# Patient Record
Sex: MALE | Race: WHITE | NOT HISPANIC OR LATINO | Employment: OTHER | URBAN - METROPOLITAN AREA
[De-identification: names, ages, dates, MRNs, and addresses within clinical notes are randomized per-mention and may not be internally consistent; named-entity substitution may affect disease eponyms.]

---

## 2017-01-04 ENCOUNTER — TELEPHONE (OUTPATIENT)
Dept: PODIATRY | Facility: CLINIC | Age: 77
End: 2017-01-04

## 2017-01-04 NOTE — TELEPHONE ENCOUNTER
----- Message from Inocencia Ignacio sent at 1/3/2017  5:06 PM CST -----  Contact: [phone]/ self    Patient needs a prescription for pain killers. He stepped on a rock with foot that was operated on. Patient says that the pain medicine that he has does not work. Please advise.

## 2017-01-04 NOTE — TELEPHONE ENCOUNTER
----- Message from Geraldine Salcedo sent at 1/3/2017  1:39 PM CST -----  Contact: [phone] / self   Patient requesting to speak with you regarding his foot operation.    States it is still hurting.    Would really like to speak with Dr Green.    Please advise.

## 2017-01-04 NOTE — TELEPHONE ENCOUNTER
----- Message from Jeanne Whitmore sent at 1/3/2017  3:44 PM CST -----  Contact: [phone]  Patient is returning your call

## 2017-01-05 ENCOUNTER — OFFICE VISIT (OUTPATIENT)
Dept: PODIATRY | Facility: CLINIC | Age: 77
End: 2017-01-05
Payer: MEDICARE

## 2017-01-05 VITALS
WEIGHT: 239 LBS | HEART RATE: 97 BPM | SYSTOLIC BLOOD PRESSURE: 138 MMHG | DIASTOLIC BLOOD PRESSURE: 79 MMHG | BODY MASS INDEX: 29.72 KG/M2 | HEIGHT: 75 IN

## 2017-01-05 DIAGNOSIS — S91.331A PUNCTURE WOUND OF FOOT, RIGHT, INITIAL ENCOUNTER: Primary | ICD-10-CM

## 2017-01-05 DIAGNOSIS — M79.671 FOOT PAIN, RIGHT: ICD-10-CM

## 2017-01-05 DIAGNOSIS — S91.301A WOUND, OPEN, FOOT, RIGHT, INITIAL ENCOUNTER: ICD-10-CM

## 2017-01-05 PROCEDURE — 3075F SYST BP GE 130 - 139MM HG: CPT | Mod: S$GLB,,, | Performed by: PODIATRIST

## 2017-01-05 PROCEDURE — 99999 PR PBB SHADOW E&M-EST. PATIENT-LVL III: CPT | Mod: PBBFAC,,, | Performed by: PODIATRIST

## 2017-01-05 PROCEDURE — 1157F ADVNC CARE PLAN IN RCRD: CPT | Mod: S$GLB,,, | Performed by: PODIATRIST

## 2017-01-05 PROCEDURE — 3078F DIAST BP <80 MM HG: CPT | Mod: S$GLB,,, | Performed by: PODIATRIST

## 2017-01-05 PROCEDURE — 1159F MED LIST DOCD IN RCRD: CPT | Mod: S$GLB,,, | Performed by: PODIATRIST

## 2017-01-05 PROCEDURE — 1125F AMNT PAIN NOTED PAIN PRSNT: CPT | Mod: S$GLB,,, | Performed by: PODIATRIST

## 2017-01-05 PROCEDURE — 99213 OFFICE O/P EST LOW 20 MIN: CPT | Mod: S$GLB,,, | Performed by: PODIATRIST

## 2017-01-05 PROCEDURE — 1160F RVW MEDS BY RX/DR IN RCRD: CPT | Mod: S$GLB,,, | Performed by: PODIATRIST

## 2017-01-05 RX ORDER — HYDROCODONE BITARTRATE AND ACETAMINOPHEN 5; 325 MG/1; MG/1
1 TABLET ORAL EVERY 4 HOURS PRN
Qty: 40 TABLET | Refills: 0 | Status: SHIPPED | OUTPATIENT
Start: 2017-01-05

## 2017-01-05 RX ORDER — BUPROPION HYDROCHLORIDE 150 MG/1
TABLET ORAL
Refills: 0 | COMMUNITY
Start: 2016-12-15

## 2017-01-05 RX ORDER — AMOXICILLIN AND CLAVULANATE POTASSIUM 875; 125 MG/1; MG/1
1 TABLET, FILM COATED ORAL 2 TIMES DAILY
Qty: 20 TABLET | Refills: 0 | Status: SHIPPED | OUTPATIENT
Start: 2017-01-05 | End: 2017-01-15

## 2017-01-05 NOTE — MR AVS SNAPSHOT
[facility]   [place] [address]  Phone: [phone]                  Jose Ng   2017 3:00 PM   Office Visit    Description:  Male : 1940   Provider:  Rory Green DPM   Department:  [facility]           Diagnoses this Visit        Comments    Puncture wound of foot, right, initial encounter    -  Primary     Foot pain, right         Closed nondisplaced oblique fracture of shaft of left fibula with routine healing, subsequent encounter                To Do List           Future Appointments        Provider Department Dept Phone    2017 3:00 PM [phone]      Goals (5 Years of Data)     None      Follow-Up and Disposition     Return in about 2 weeks (around 2017).       These Medications        Disp Refills Start End    amoxicillin-clavulanate 875-125mg (AUGMENTIN) 875-125 mg per tablet 20 tablet 0 2017 1/15/2017    Take 1 tablet by mouth 2 (two) times daily. - Oral    [facility] [address] [place] [address] [place] [address] AT [facility] Ph #: [phone]       hydrocodone-acetaminophen 5-325mg (NORCO) 5-325 mg per tablet 40 tablet 0 2017     Take 1 tablet by mouth every 4 (four) hours as needed for Pain. - Oral    [facility] [address] [place] [address] [place] - [address] AT [facility] Ph #: [phone]         [facility]     [facility] -  Assistance  Registered nurses in the [facility] provide clinical advisement, health education, appointment booking, and other advisory services.  Call for this free service at [phone].             Medications           Message regarding Medications     Verify the changes and/or additions to your medication regime listed below are the same as discussed with your clinician today.  If any of these changes or additions are  incorrect, please notify your healthcare provider.        START taking these NEW medications        Refills    amoxicillin-clavulanate 875-125mg (AUGMENTIN) 875-125 mg per tablet 0    Sig: Take 1 tablet by mouth 2 (two) times daily.    Class: Normal    Route: Oral      CHANGE how you are taking these medications     Start Taking Instead of    hydrocodone-acetaminophen 5-325mg (NORCO) 5-325 mg per tablet hydrocodone-acetaminophen 5-325mg (NORCO) 5-325 mg per tablet    Dosage:  Take 1 tablet by mouth every 4 (four) hours as needed for Pain. Dosage:  Take 1 tablet by mouth every 6 (six) hours as needed for Pain.    Reason for Change:  Reorder            Verify that the below list of medications is an accurate representation of the medications you are currently taking.  If none reported, the list may be blank. If incorrect, please contact your healthcare provider. Carry this list with you in case of emergency.           Current Medications     aspirin (ECOTRIN) 81 MG EC tablet Take 81 mg by mouth once daily.    buPROPion (WELLBUTRIN XL) 150 MG TB24 tablet TK 1 T PO Q 24 H    gabapentin (NEURONTIN) 800 MG tablet     hydrocodone-acetaminophen 5-325mg (NORCO) 5-325 mg per tablet Take 1 tablet by mouth every 6 (six) hours as needed for Pain.    hydrocodone-acetaminophen 5-325mg (NORCO) 5-325 mg per tablet Take 1 tablet by mouth every 4 (four) hours as needed for Pain.    irbesartan (AVAPRO) 300 MG tablet     memantine (NAMENDA) 5 MG Tab Take 5 mg by mouth 2 (two) times daily.    methocarbamol (ROBAXIN) 500 MG Tab TK 1 - 2 TS PO QAM AND QHS    naproxen (EC NAPROSYN) 500 MG EC tablet TAKE 1 TABLET(500 MG) BY MOUTH TWICE DAILY WITH MEALS    PE/HYDROCOD/ACETAMINOPHEN/CPM (HYDROCOD-CPM-PE-ACETAMINOPHEN ORAL)     pravastatin (PRAVACHOL) 40 MG tablet     sertraline (ZOLOFT) 50 MG tablet Take 50 mg by mouth once daily.    tramadol (ULTRAM) 50 mg tablet     amoxicillin-clavulanate 875-125mg (AUGMENTIN) 875-125 mg per tablet Take 1  "tablet by mouth 2 (two) times daily.           Clinical Reference Information           Vital Signs - Last Recorded  Most recent update: 1/5/2017  3:38 PM by Darren Fontanez MA    BP Pulse Ht Wt BMI    138/79 97 6' 3" (1.905 m) 108.4 kg (239 lb) 29.87 kg/m2      Blood Pressure          Most Recent Value    BP  138/79      Allergies as of 1/5/2017     No Known Allergies      Immunizations Administered on Date of Encounter - 1/5/2017     None      "
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
